# Patient Record
Sex: FEMALE | Race: BLACK OR AFRICAN AMERICAN | Employment: FULL TIME | URBAN - METROPOLITAN AREA
[De-identification: names, ages, dates, MRNs, and addresses within clinical notes are randomized per-mention and may not be internally consistent; named-entity substitution may affect disease eponyms.]

---

## 2022-06-15 ENCOUNTER — OFFICE VISIT (OUTPATIENT)
Dept: OBGYN CLINIC | Age: 27
End: 2022-06-15

## 2022-06-15 VITALS
DIASTOLIC BLOOD PRESSURE: 82 MMHG | HEIGHT: 66 IN | WEIGHT: 182.4 LBS | SYSTOLIC BLOOD PRESSURE: 122 MMHG | BODY MASS INDEX: 29.32 KG/M2

## 2022-06-15 DIAGNOSIS — Z11.3 SCREEN FOR STD (SEXUALLY TRANSMITTED DISEASE): ICD-10-CM

## 2022-06-15 DIAGNOSIS — Z12.4 PAP SMEAR FOR CERVICAL CANCER SCREENING: Primary | ICD-10-CM

## 2022-06-15 DIAGNOSIS — Z01.419 WOMEN'S ANNUAL ROUTINE GYNECOLOGICAL EXAMINATION: ICD-10-CM

## 2022-06-15 PROCEDURE — 99385 PREV VISIT NEW AGE 18-39: CPT | Performed by: NURSE PRACTITIONER

## 2022-06-15 RX ORDER — ETONOGESTREL 68 MG/1
68 IMPLANT SUBCUTANEOUS ONCE
COMMUNITY

## 2022-06-15 ASSESSMENT — PATIENT HEALTH QUESTIONNAIRE - PHQ9
SUM OF ALL RESPONSES TO PHQ QUESTIONS 1-9: 0
SUM OF ALL RESPONSES TO PHQ QUESTIONS 1-9: 0
2. FEELING DOWN, DEPRESSED OR HOPELESS: 0
SUM OF ALL RESPONSES TO PHQ QUESTIONS 1-9: 0
1. LITTLE INTEREST OR PLEASURE IN DOING THINGS: 0
SUM OF ALL RESPONSES TO PHQ9 QUESTIONS 1 & 2: 0
SUM OF ALL RESPONSES TO PHQ QUESTIONS 1-9: 0

## 2022-06-15 NOTE — PROGRESS NOTES
Patient is here today for an annual exam. She would like to do both labs and vaginal STD testing. LAST PAP:  2021 per patient, normal in [place]    LAST MAMMO:  never    LMP:  No LMP recorded. Patient has had an implant.     BIRTH CONTROL: Nexplanon    TOBACCO USE:  No    FAMILY HISTORY OF:   Breast Cancer:  No   Ovarian Cancer:  No   Uterine Cancer: No   Colon Cancer: No    Vitals:    06/15/22 0839   BP: 122/82   Site: Left Upper Arm   Position: Sitting   Weight: 182 lb 6.4 oz (82.7 kg)   Height: 5' 6\" (1.676 m)       Haylee Garcia MA  06/15/22  8:46 AM

## 2022-06-15 NOTE — PROGRESS NOTES
I have reviewed the patient's visit today including history, exam and assessment by Sammye Bumpers, WHNP-BC. I agree with treatment/plan as above.     Flora Khalil MD  10:08 AM  06/15/22

## 2022-06-15 NOTE — PROGRESS NOTES
Janis Casillas is a 32 y.o. [place] Neftali who is here today for annual exam        No LMP recorded. Patient has had an implant. No menses with nexplanon inserted 2019      Last Pap:2021    Hx abnormal pap or STD:hx abnormal pap many years ago, hx GC at age 12    Last Mammo: never    Fam Hx of Breast, ovarian or uterine cancer:denies          ROS:    Breast: Denies pain, lump or nipple discharge    GYN: Denies pelvic pain, discharge, itching, odor or dysuria. Constitutional: Negative for chills and fever. HENT: Negative for severe headaches or vision changes    Respiratory: Negative for cough and shortness of breath. Cardiovascular: Negative for chest pain and palpitations. Gastrointestinal: Negative for nausea and vomiting. Negative for diarrhea and constipation    Genitourinary: Negative for dysuria and hematuria. Musculoskeletal: Negative for back pain    Skin: Negative for rash and wound. Psych: No depression or anxiety          History reviewed. No pertinent past medical history. History reviewed. No pertinent surgical history. Family History   Problem Relation Age of Onset    Lupus Maternal Grandmother     Lupus Maternal Aunt     Breast Cancer Neg Hx     Colon Cancer Neg Hx     Ovarian Cancer Neg Hx     Uterine Cancer Neg Hx        Social History     Socioeconomic History    Marital status: Single     Spouse name: Not on file    Number of children: Not on file    Years of education: Not on file    Highest education level: Not on file   Occupational History    Not on file   Tobacco Use    Smoking status: Never Smoker    Smokeless tobacco: Never Used   Vaping Use    Vaping Use: Never used   Substance and Sexual Activity    Alcohol use:  Yes    Drug use: Yes     Types: Marijuana Zollie Axe)    Sexual activity: Not Currently     Partners: Female   Other Topics Concern    Not on file   Social History Narrative    Not on file     Social Determinants of Health     Financial Resource Strain:     Difficulty of Paying Living Expenses: Not on file   Food Insecurity:     Worried About Running Out of Food in the Last Year: Not on file    Veronica of Food in the Last Year: Not on file   Transportation Needs:     Lack of Transportation (Medical): Not on file    Lack of Transportation (Non-Medical): Not on file   Physical Activity:     Days of Exercise per Week: Not on file    Minutes of Exercise per Session: Not on file   Stress:     Feeling of Stress : Not on file   Social Connections:     Frequency of Communication with Friends and Family: Not on file    Frequency of Social Gatherings with Friends and Family: Not on file    Attends Latter-day Services: Not on file    Active Member of [address] [place] [address] or Organizations: Not on file    Attends Club or Organization Meetings: Not on file    Marital Status: Not on file   Intimate Partner Violence:     Fear of Current or Ex-Partner: Not on file    Emotionally Abused: Not on file    Physically Abused: Not on file    Sexually Abused: Not on file   Housing Stability:     Unable to Pay for Housing in the Last Year: Not on file    Number of [place] in the Last Year: Not on file    Unstable Housing in the Last Year: Not on file           Objective:    Vitals:    06/15/22 0839   BP: 122/82   Site: Left Upper Arm   Position: Sitting   Weight: 182 lb 6.4 oz (82.7 kg)   Height: 5' 6\" (1.676 m)           Constitutional:  well-developed, well-nourished, and in no distress. Mental: Alert and awake. Oriented to person/place/time. Able to follow commands    Eyes: EOM nl, Sclera nl, Ocular Discharge not visualized    HENT: Normocephalic and atraumatic. Mouth/Throat: Mucous membranes are moist. External Ears: nl. No cervical code adneopathy    Neck: Normal range of motion. Neck supple. No thyromegaly present. Cardiovascular: Normal rate and regular rhythm.       Pulmonary: Effort normal; No visualized signs of difficulty breathing or respiratory ASCUS ([id number])          Orders Placed This Encounter   Procedures    PAP IG, CT-NG-TV, rfx Aptima HPV ASCUS ([id number])       Outpatient Encounter Medications as of 6/15/2022   Medication Sig Dispense Refill    etonogestrel (NEXPLANON) 68 MG implant 68 mg by Subdermal route once Inserted 2019       No facility-administered encounter medications on file as of 6/15/2022.

## 2022-06-15 NOTE — ASSESSMENT & PLAN NOTE
Pap today+ GC, declines serum labs  mammo n/a  nexplanon for birth control, inserted 2019. Pt advised evidence shows nexplanon still 99% effective up to 4 years.  Pt desires removal/replacement and will f/u at [address] 1 year

## 2022-06-21 LAB
C TRACH RRNA CVX QL NAA+PROBE: NEGATIVE
CYTOLOGIST CVX/VAG CYTO: ABNORMAL
CYTOLOGY CVX/VAG DOC THIN PREP: ABNORMAL
HPV REFLEX: ABNORMAL
Lab: ABNORMAL
N GONORRHOEA RRNA CVX QL NAA+PROBE: NEGATIVE
PATH REPORT.FINAL DX SPEC: ABNORMAL
STAT OF ADQ CVX/VAG CYTO-IMP: ABNORMAL
T VAGINALIS RRNA SPEC QL NAA+PROBE: POSITIVE

## 2022-06-22 ENCOUNTER — TELEPHONE (OUTPATIENT)
Dept: OBGYN CLINIC | Age: 27
End: 2022-06-22

## 2022-06-22 RX ORDER — METRONIDAZOLE 500 MG/1
TABLET ORAL
Qty: 4 TABLET | Refills: 0 | Status: SHIPPED | OUTPATIENT
Start: 2022-06-22

## 2022-06-22 NOTE — TELEPHONE ENCOUNTER
1. Patient tested positive for Trichomonas. This is a sexually transmitted infection. This can be treated with the following, if not allergic    Flagyl 500mg PO BID for 7 days, #14, No Refills   OR    Flagyl 2grams PO Once, #4, No Refills    Patient needs to notify partner so they can follow up with their PCP or [facility] for testing and/or treatment. If patient is pregnant, we will retest during her pregnancy. If not pregnant, please schedule patient for retest in 3 months.       2. If she wants to do serum std labs, please schedule

## 2022-07-15 PROBLEM — Z01.419 WOMEN'S ANNUAL ROUTINE GYNECOLOGICAL EXAMINATION: Status: RESOLVED | Noted: 2022-06-15 | Resolved: 2022-07-15

## 2022-11-22 ENCOUNTER — OFFICE VISIT (OUTPATIENT)
Dept: OBGYN CLINIC | Age: 27
End: 2022-11-22
Payer: MEDICAID

## 2022-11-22 VITALS
SYSTOLIC BLOOD PRESSURE: 130 MMHG | HEIGHT: 66 IN | WEIGHT: 186 LBS | BODY MASS INDEX: 29.89 KG/M2 | DIASTOLIC BLOOD PRESSURE: 86 MMHG

## 2022-11-22 DIAGNOSIS — Z11.3 SCREEN FOR STD (SEXUALLY TRANSMITTED DISEASE): Primary | ICD-10-CM

## 2022-11-22 DIAGNOSIS — A59.01 TRICHOMONAL VAGINITIS: ICD-10-CM

## 2022-11-22 PROCEDURE — 99212 OFFICE O/P EST SF 10 MIN: CPT | Performed by: NURSE PRACTITIONER

## 2022-11-22 ASSESSMENT — PATIENT HEALTH QUESTIONNAIRE - PHQ9
SUM OF ALL RESPONSES TO PHQ QUESTIONS 1-9: 0
2. FEELING DOWN, DEPRESSED OR HOPELESS: 0
SUM OF ALL RESPONSES TO PHQ9 QUESTIONS 1 & 2: 0
SUM OF ALL RESPONSES TO PHQ QUESTIONS 1-9: 0
SUM OF ALL RESPONSES TO PHQ QUESTIONS 1-9: 0
1. LITTLE INTEREST OR PLEASURE IN DOING THINGS: 0
SUM OF ALL RESPONSES TO PHQ QUESTIONS 1-9: 0

## 2022-11-22 NOTE — PROGRESS NOTES
Sachin Wasserman is a 32 y.o. Rayray Autumn who is here for NICHOLAS. Pt had annual June 2022 and pap was positive for trich vag. Pt completed treatment. Has not had intercourse with that partner since. Patient's last menstrual period was 10/20/2022 (approximate). Past Medical History:   Diagnosis Date    Trichomonal vaginitis        History reviewed. No pertinent surgical history. Family History   Problem Relation Age of Onset    Lupus Maternal Grandmother     Lupus Maternal Aunt     Breast Cancer Neg Hx     Colon Cancer Neg Hx     Ovarian Cancer Neg Hx     Uterine Cancer Neg Hx        Social History     Socioeconomic History    Marital status: Single     Spouse name: Not on file    Number of children: Not on file    Years of education: Not on file    Highest education level: Not on file   Occupational History    Not on file   Tobacco Use    Smoking status: Never    Smokeless tobacco: Never   Vaping Use    Vaping Use: Never used   Substance and Sexual Activity    Alcohol use: Yes    Drug use: Yes     Types: Marijuana Nicolás Heidrick)    Sexual activity: Yes     Partners: Male   Other Topics Concern    Not on file   Social History Narrative    Not on file     Social Determinants of Health     Financial Resource Strain: Not on file   Food Insecurity: Not on file   Transportation Needs: Not on file   Physical Activity: Not on file   Stress: Not on file   Social Connections: Not on file   Intimate Partner Violence: Not on file   Housing Stability: Not on file           Objective:    Vitals:    11/22/22 0835   BP: 130/86   Site: Left Upper Arm   Position: Sitting   Weight: 186 lb (84.4 kg)   Height: 5' 6\" (1.676 m)         Constitutional:  well-developed, well-nourished, and in no distress. Mental: Alert and awake. Oriented to person/place/time. Able to follow commands    Eyes: EOM nl, Sclera nl, Ocular Discharge not visualized    HENT: Normocephalic and atraumatic.  Mouth/Throat: Mucous membranes are moist    External Ears: nl    Neck: Normal range of motion. No masses visualized       Pulmonary: Effort normal. No visualized signs of difficulty breathing or respiratory distress    Pelvic Exam:       External: normal female genitalia without lesions or masses       Vagina: normal without lesions or masses, no discharge noted      Cervix: normal without lesions or masses           Musculoskeletal: Normal range of motion. Normal gait with no signs of ataxia     Neurological: No Facial Asymmetry (Cranial nerve 7 motor function) No gaze palsy    Skin: No significant exanthematous lesions or discoloration noted on facial skin      Psych: Normal affect. No hallucinations              Assessment/Plan            Patient Active Problem List    Diagnosis Date Noted    Trichomonal vaginitis 11/22/2022     Priority: Medium     Assessment & Plan Note:     NICHOLAS today  Birth control options reviewed, handout given  Pt to call if she decides to start         Problem List Items Addressed This Visit          Genitourinary    Trichomonal vaginitis     NICHOLAS today  Birth control options reviewed, handout given  Pt to call if she decides to start          Other Visit Diagnoses       Screen for STD (sexually transmitted disease)    -  Primary    Relevant Orders    Chlamydia, Gonorrhea, Trichomoniasis            Orders Placed This Encounter   Procedures    Chlamydia, Gonorrhea, Trichomoniasis       Outpatient Encounter Medications as of 11/22/2022   Medication Sig Dispense Refill    [DISCONTINUED] metroNIDAZOLE (FLAGYL) 500 MG tablet Take 4 tablets now by mouth for one dose. (Patient not taking: Reported on 11/22/2022) 4 tablet 0    [DISCONTINUED] etonogestrel (NEXPLANON) 68 MG implant 68 mg by Subdermal route once Inserted 2019 (Patient not taking: Reported on 11/22/2022)       No facility-administered encounter medications on file as of 11/22/2022.                Cristiana Hughes NP, APRN - CNP 11/22/22 8:50 AM

## 2022-11-22 NOTE — PROGRESS NOTES
Pt comes in today for repeat pap. Last pap was Trich positive     LAST PAP:  06/16/2022 Neg     LAST MAMMO:  Never    LMP:  Patient's last menstrual period was 10/20/2022 (approximate).     BIRTH CONTROL:  none    TOBACCO USE:  No    FAMILY HISTORY OF:   Breast Cancer:  No   Ovarian Cancer:  No   Uterine Cancer:  No   Colon Cancer:  No    Vitals:    11/22/22 0835   BP: 130/86   Site: Left Upper Arm   Position: Sitting   Weight: 186 lb (84.4 kg)   Height: 5' 6\" (1.676 m)        Sujey Alcantara MA  11/22/22  8:41 AM

## 2022-11-27 LAB
C TRACH RRNA SPEC QL NAA+PROBE: NEGATIVE
N GONORRHOEA RRNA SPEC QL NAA+PROBE: NEGATIVE
SPECIMEN SOURCE: NORMAL
T VAGINALIS RRNA SPEC QL NAA+PROBE: NEGATIVE